# Patient Record
Sex: MALE | Race: WHITE | NOT HISPANIC OR LATINO
[De-identification: names, ages, dates, MRNs, and addresses within clinical notes are randomized per-mention and may not be internally consistent; named-entity substitution may affect disease eponyms.]

---

## 2023-06-21 ENCOUNTER — P2P PATIENT RECORD (OUTPATIENT)
Age: 68
End: 2023-06-21

## 2023-06-21 ENCOUNTER — TELEPHONE ENCOUNTER (OUTPATIENT)
Dept: URBAN - METROPOLITAN AREA CLINIC 63 | Facility: CLINIC | Age: 68
End: 2023-06-21

## 2023-07-06 ENCOUNTER — LAB OUTSIDE AN ENCOUNTER (OUTPATIENT)
Dept: URBAN - METROPOLITAN AREA CLINIC 63 | Facility: CLINIC | Age: 68
End: 2023-07-06

## 2023-07-06 ENCOUNTER — OFFICE VISIT (OUTPATIENT)
Dept: URBAN - METROPOLITAN AREA CLINIC 63 | Facility: CLINIC | Age: 68
End: 2023-07-06
Payer: COMMERCIAL

## 2023-07-06 ENCOUNTER — WEB ENCOUNTER (OUTPATIENT)
Dept: URBAN - METROPOLITAN AREA CLINIC 63 | Facility: CLINIC | Age: 68
End: 2023-07-06

## 2023-07-06 VITALS
TEMPERATURE: 98.4 F | SYSTOLIC BLOOD PRESSURE: 138 MMHG | HEART RATE: 114 BPM | BODY MASS INDEX: 23.07 KG/M2 | OXYGEN SATURATION: 98 % | DIASTOLIC BLOOD PRESSURE: 74 MMHG | WEIGHT: 147 LBS | HEIGHT: 67 IN

## 2023-07-06 DIAGNOSIS — Z12.11 COLON CANCER SCREENING: ICD-10-CM

## 2023-07-06 DIAGNOSIS — B18.2 CHRONIC HEPATITIS C WITHOUT HEPATIC COMA: ICD-10-CM

## 2023-07-06 PROBLEM — 128302006: Status: ACTIVE | Noted: 2023-07-06

## 2023-07-06 PROCEDURE — 99204 OFFICE O/P NEW MOD 45 MIN: CPT | Performed by: PHYSICIAN ASSISTANT

## 2023-07-06 RX ORDER — LISINOPRIL 10 MG/1
TAKE 1 TABLET BY MOUTH EVERY DAY TABLET ORAL
Qty: 90 EACH | Refills: 0 | Status: ACTIVE | COMMUNITY

## 2023-07-06 RX ORDER — POLYETHYLENE GLYCOL 3350, SODIUM CHLORIDE, SODIUM BICARBONATE, POTASSIUM CHLORIDE 420; 11.2; 5.72; 1.48 G/4L; G/4L; G/4L; G/4L
AS DIRECTED POWDER, FOR SOLUTION ORAL ONCE
Qty: 4000 | Refills: 0 | OUTPATIENT
Start: 2023-07-06 | End: 2023-07-07

## 2023-07-06 RX ORDER — ONDANSETRON HYDROCHLORIDE 4 MG/1
1 TABLET TABLET, FILM COATED ORAL
Qty: 2 | Refills: 0 | OUTPATIENT
Start: 2023-07-06

## 2023-07-06 RX ORDER — CLONAZEPAM 0.5 MG/1
TAKE 1 TABLET BY MOUTH THREE TIMES DAILY TABLET ORAL
Qty: 90 EACH | Refills: 0 | Status: ACTIVE | COMMUNITY

## 2023-07-06 NOTE — HPI-TODAY'S VISIT:
Dmitriy is a 67-year-old male who is referred for evaluation of chronic hepatitis C.  According to the referral notes, Dmitriy has been diagnosed with onset of hepatitis C 12/1/2007, genotype 3.  Other past medical history includes mild recurrent major depression, depressive disorder, benign essential hypertension, loss of appetite, bleeding from nose, anterior epistaxis, abnormal glucose level, stool DNA based colorectal cancer screening positive onset 4/13/2023, panic disorder.  Recent lab work performed 6/6/2023: CBC WBC 12.2, hemoglobin 13.2, hematocrit 39.2, platelets 356,  Patient had recent complaint of nose bleeds. Hasn't had a nose bleed in 3 weeks.  He has never had a colonoscopy.  Denies any diarrhea, constipation, weight loss, hematochezia, melena, acid reflux, heartburn, dysphagia. No cardiologist.  He does have history of smoking, denies COPD or asthma, admits he snores, no diagnosis of sleep apnea.

## 2023-07-13 ENCOUNTER — LAB OUTSIDE AN ENCOUNTER (OUTPATIENT)
Dept: URBAN - METROPOLITAN AREA CLINIC 63 | Facility: CLINIC | Age: 68
End: 2023-07-13

## 2023-08-03 ENCOUNTER — OUT OF OFFICE VISIT (OUTPATIENT)
Dept: URBAN - METROPOLITAN AREA SURGERY CENTER 4 | Facility: SURGERY CENTER | Age: 68
End: 2023-08-03
Payer: COMMERCIAL

## 2023-08-03 ENCOUNTER — CLAIMS CREATED FROM THE CLAIM WINDOW (OUTPATIENT)
Dept: URBAN - METROPOLITAN AREA CLINIC 4 | Facility: CLINIC | Age: 68
End: 2023-08-03
Payer: COMMERCIAL

## 2023-08-03 DIAGNOSIS — K57.30 DIVERTICULA OF COLON: ICD-10-CM

## 2023-08-03 DIAGNOSIS — Z12.11 COLON CANCER SCREENING: ICD-10-CM

## 2023-08-03 DIAGNOSIS — K63.5 BENIGN COLON POLYPS: ICD-10-CM

## 2023-08-03 DIAGNOSIS — D12.4 BENIGN NEOPLASM OF DESCENDING COLON: ICD-10-CM

## 2023-08-03 DIAGNOSIS — D12.3 BENIGN NEOPLASM OF TRANSVERSE COLON: ICD-10-CM

## 2023-08-03 DIAGNOSIS — K63.89 OTHER SPECIFIED DISEASES OF INTESTINE: ICD-10-CM

## 2023-08-03 DIAGNOSIS — K62.1 RECTAL POLYP: ICD-10-CM

## 2023-08-03 DIAGNOSIS — Z12.11 COLON CANCER SCREENING (HIGH RISK): ICD-10-CM

## 2023-08-03 DIAGNOSIS — A63.0 ANOGENITAL (VENEREAL) WARTS: ICD-10-CM

## 2023-08-03 DIAGNOSIS — D12.5 BENIGN NEOPLASM OF SIGMOID COLON: ICD-10-CM

## 2023-08-03 PROCEDURE — 00811 ANES LWR INTST NDSC NOS: CPT | Performed by: NURSE ANESTHETIST, CERTIFIED REGISTERED

## 2023-08-03 PROCEDURE — 45385 COLONOSCOPY W/LESION REMOVAL: CPT | Performed by: INTERNAL MEDICINE

## 2023-08-03 PROCEDURE — 45380 COLONOSCOPY AND BIOPSY: CPT | Performed by: INTERNAL MEDICINE

## 2023-08-03 PROCEDURE — 88341 IMHCHEM/IMCYTCHM EA ADD ANTB: CPT | Performed by: PATHOLOGY

## 2023-08-03 PROCEDURE — 88342 IMHCHEM/IMCYTCHM 1ST ANTB: CPT | Performed by: PATHOLOGY

## 2023-08-03 PROCEDURE — 88305 TISSUE EXAM BY PATHOLOGIST: CPT | Performed by: PATHOLOGY

## 2023-08-03 RX ORDER — CLONAZEPAM 0.5 MG/1
TAKE 1 TABLET BY MOUTH THREE TIMES DAILY TABLET ORAL
Qty: 90 EACH | Refills: 0 | Status: ACTIVE | COMMUNITY

## 2023-08-03 RX ORDER — LISINOPRIL 10 MG/1
TAKE 1 TABLET BY MOUTH EVERY DAY TABLET ORAL
Qty: 90 EACH | Refills: 0 | Status: ACTIVE | COMMUNITY

## 2023-08-03 RX ORDER — ONDANSETRON HYDROCHLORIDE 4 MG/1
1 TABLET TABLET, FILM COATED ORAL
Qty: 2 | Refills: 0 | Status: ACTIVE | COMMUNITY
Start: 2023-07-06

## 2023-08-21 LAB
A/G RATIO: 1.3
AFP, SERUM, TUMOR MARKER: 3.3
ALBUMIN: 4.4
ALKALINE PHOSPHATASE: 76
ALT (SGPT): 28
AST (SGOT): 37
BILIRUBIN, TOTAL: 0.4
BUN/CREATININE RATIO: 10
BUN: 10
CALCIUM: 9.4
CARBON DIOXIDE, TOTAL: 22
CHLORIDE: 97
CREATININE: 1.04
EGFR: 78
GLOBULIN, TOTAL: 3.4
GLUCOSE: 104
HBSAG SCREEN: NEGATIVE
HCV AB: REACTIVE
HCV LOG10: 5.12
HEMATOCRIT: 38.5
HEMOGLOBIN: 11.9
HEP B CORE AB, IGM: NEGATIVE
HEP B CORE AB, TOT: NEGATIVE
HEPATITIS C GENOTYPE: 3
HEPATITIS C QUANTITATION: (no result)
INTERPRETATION:: (no result)
Lab: (no result)
MCH: 24.4
MCHC: 30.9
MCV: 79
NRBC: (no result)
PLATELETS: 322
POTASSIUM: 4.6
PROTEIN, TOTAL: 7.8
QUANTIFERON CRITERIA: (no result)
QUANTIFERON INCUBATION: (no result)
QUANTIFERON MITOGEN VALUE: >10
QUANTIFERON NIL VALUE: 0.01
QUANTIFERON TB1 AG VALUE: 0.17
QUANTIFERON TB2 AG VALUE: 0.17
QUANTIFERON-TB GOLD PLUS: NEGATIVE
RBC: 4.87
RDW: 14
SODIUM: 136
TEST INFORMATION:: (no result)
WBC: 8.4

## 2023-08-24 ENCOUNTER — TELEPHONE ENCOUNTER (OUTPATIENT)
Dept: URBAN - METROPOLITAN AREA CLINIC 63 | Facility: CLINIC | Age: 68
End: 2023-08-24

## 2023-08-24 ENCOUNTER — OFFICE VISIT (OUTPATIENT)
Dept: URBAN - METROPOLITAN AREA CLINIC 63 | Facility: CLINIC | Age: 68
End: 2023-08-24
Payer: COMMERCIAL

## 2023-08-24 ENCOUNTER — LAB OUTSIDE AN ENCOUNTER (OUTPATIENT)
Dept: URBAN - METROPOLITAN AREA CLINIC 63 | Facility: CLINIC | Age: 68
End: 2023-08-24

## 2023-08-24 VITALS
OXYGEN SATURATION: 99 % | DIASTOLIC BLOOD PRESSURE: 88 MMHG | WEIGHT: 158 LBS | HEART RATE: 81 BPM | TEMPERATURE: 97.3 F | SYSTOLIC BLOOD PRESSURE: 146 MMHG | HEIGHT: 67 IN | BODY MASS INDEX: 24.8 KG/M2

## 2023-08-24 DIAGNOSIS — B18.2 CHRONIC HEPATITIS C WITHOUT HEPATIC COMA: ICD-10-CM

## 2023-08-24 DIAGNOSIS — A63.0 CONDYLOMA ACUMINATUM: ICD-10-CM

## 2023-08-24 DIAGNOSIS — K74.00 LIVER FIBROSIS: ICD-10-CM

## 2023-08-24 DIAGNOSIS — K57.90 DIVERTICULOSIS: ICD-10-CM

## 2023-08-24 DIAGNOSIS — Z86.010 PERSONAL HISTORY OF COLONIC POLYPS: ICD-10-CM

## 2023-08-24 PROBLEM — 62484002: Status: ACTIVE | Noted: 2023-08-24

## 2023-08-24 PROBLEM — 428283002: Status: ACTIVE | Noted: 2023-08-24

## 2023-08-24 PROBLEM — 240542006: Status: ACTIVE | Noted: 2023-08-24

## 2023-08-24 PROBLEM — 397881000: Status: ACTIVE | Noted: 2023-08-24

## 2023-08-24 PROCEDURE — 99214 OFFICE O/P EST MOD 30 MIN: CPT | Performed by: PHYSICIAN ASSISTANT

## 2023-08-24 RX ORDER — ONDANSETRON HYDROCHLORIDE 4 MG/1
1 TABLET TABLET, FILM COATED ORAL
Qty: 2 | Refills: 0 | COMMUNITY
Start: 2023-07-06

## 2023-08-24 RX ORDER — LISINOPRIL 10 MG/1
TAKE 1 TABLET BY MOUTH EVERY DAY TABLET ORAL
Qty: 90 EACH | Refills: 0 | COMMUNITY

## 2023-08-24 RX ORDER — VELPATASVIR AND SOFOSBUVIR 100; 400 MG/1; MG/1
1 TABLET TABLET, FILM COATED ORAL ONCE A DAY
Qty: 28 TABLET | Refills: 2 | OUTPATIENT
Start: 2023-08-24 | End: 2023-11-16

## 2023-08-24 RX ORDER — CLONAZEPAM 0.5 MG/1
TAKE 1 TABLET BY MOUTH THREE TIMES DAILY TABLET ORAL
Qty: 90 EACH | Refills: 0 | COMMUNITY

## 2023-08-24 NOTE — HPI-TODAY'S VISIT:
Dmitriy is here to follow up recent colonoscopy and chronic Hep C. Colonoscopy 8/3/2023, Dr. Mcdermott: Nonbleeding internal hemorrhoids Diverticulosis in the entire examined colon one 4 mm polyp in the distal rectum Two 5 to 8 mm polyps in the sigmoid colon One 6 mm polyp in the descending colon Three 6 to 7 mm polyps in the transverse colon Two 5 to 7 mm polyps in the ascending colon Pathology sigmoid polypectomy tubular adenomas, hyperplastic polyps.  Transverse polypectomy tubular adenomas benign mucosal polyps.  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  FibroScan 7/24/2023 S0, F4. Ultrasound abdomen 7/20/2023 Morphologic changes of cirrhosis without suspicious hepatic mass. Benign-appearing cyst noted measuring up to 0.9 cm. Recommend additional surveillance with either CT or MRI every 6 to 12 months to increase the sensitivity of detecting small hepatocellular carcinoma. Cholelithiasis without cholecystitis. Lab work performed 8/15/2023 AFP 3.3 Hepatitis B core antibody, IgM negative QuantiFERON TB Gold plus negative Glucose 104 Creatinine 1.04 Sodium 136 Potassium 4.6 Albumin 4.4 Bilirubin 0.4 Alkaline phosphatase 76 AST 37 ALT 28 WBC 8.4 Hemoglobin 11.9 Hematocrit 38.5 MCV 79 MCH 24.4 MCHC 30.9 Platelets 322 Hep B core antibody total negative Hep B surface antigen negative Hep C genotype 3 Hep C antibody reactive Hepatitis C quantitation 133,000 HCV log 10 5.1-4   LOV 7/6/23: Dmitriy is a 67-year-old male who is referred for evaluation of chronic hepatitis C.  According to the referral notes, Dmitriy has been diagnosed with onset of hepatitis C 12/1/2007, genotype 3.  Other past medical history includes mild recurrent major depression, depressive disorder, benign essential hypertension, loss of appetite, bleeding from nose, anterior epistaxis, abnormal glucose level, stool DNA based colorectal cancer screening positive onset 4/13/2023, panic disorder.  Recent lab work performed 6/6/2023: CBC WBC 12.2, hemoglobin 13.2, hematocrit 39.2, platelets 356,  Patient had recent complaint of nose bleeds. Hasn't had a nose bleed in 3 weeks.  He has never had a colonoscopy.  Denies any diarrhea, constipation, weight loss, hematochezia, melena, acid reflux, heartburn, dysphagia. No cardiologist.  He does have history of smoking, denies COPD or asthma, admits he snores, no diagnosis of sleep apnea.

## 2023-09-25 LAB
A/G RATIO: 1.3
ALBUMIN: 4.4
ALKALINE PHOSPHATASE: 68
ALT (SGPT): 12
AST (SGOT): 19
BILIRUBIN, TOTAL: 0.4
BUN/CREATININE RATIO: 12
BUN: 13
CALCIUM: 9.5
CARBON DIOXIDE, TOTAL: 23
CHLORIDE: 96
CREATININE: 1.12
EGFR: 71
GLOBULIN, TOTAL: 3.5
GLUCOSE: 109
HCV LOG10: 1.3
HEMATOCRIT: 40.5
HEMOGLOBIN: 12.8
HEPATITIS C QUANTITATION: 20
Lab: (no result)
MCH: 23.7
MCHC: 31.6
MCV: 75
NRBC: (no result)
PLATELETS: 302
POTASSIUM: 4.8
PROTEIN, TOTAL: 7.9
RBC: 5.4
RDW: 15.2
SODIUM: 133
TEST INFORMATION:: (no result)
WBC: 7.9

## 2023-10-02 ENCOUNTER — OFFICE VISIT (OUTPATIENT)
Dept: URBAN - METROPOLITAN AREA CLINIC 63 | Facility: CLINIC | Age: 68
End: 2023-10-02
Payer: COMMERCIAL

## 2023-10-02 VITALS
TEMPERATURE: 97.2 F | BODY MASS INDEX: 24.96 KG/M2 | OXYGEN SATURATION: 97 % | HEART RATE: 77 BPM | DIASTOLIC BLOOD PRESSURE: 100 MMHG | SYSTOLIC BLOOD PRESSURE: 150 MMHG | HEIGHT: 67 IN | WEIGHT: 159 LBS

## 2023-10-02 DIAGNOSIS — A63.0 CONDYLOMA ACUMINATA: ICD-10-CM

## 2023-10-02 DIAGNOSIS — Z86.010 PERSONAL HISTORY OF COLONIC POLYPS: ICD-10-CM

## 2023-10-02 DIAGNOSIS — K74.69 OTHER CIRRHOSIS OF LIVER: ICD-10-CM

## 2023-10-02 DIAGNOSIS — B18.2 CHRONIC HEPATITIS C WITHOUT HEPATIC COMA: ICD-10-CM

## 2023-10-02 PROBLEM — 19943007: Status: ACTIVE | Noted: 2023-10-02

## 2023-10-02 PROCEDURE — 99214 OFFICE O/P EST MOD 30 MIN: CPT | Performed by: PHYSICIAN ASSISTANT

## 2023-10-02 RX ORDER — VELPATASVIR AND SOFOSBUVIR 100; 400 MG/1; MG/1
1 TABLET TABLET, FILM COATED ORAL ONCE A DAY
Qty: 28 TABLET | Refills: 2 | Status: ACTIVE | COMMUNITY
Start: 2023-08-24 | End: 2023-11-16

## 2023-10-02 RX ORDER — CLONAZEPAM 0.5 MG/1
TAKE 1 TABLET BY MOUTH THREE TIMES DAILY TABLET ORAL
Qty: 90 EACH | Refills: 0 | Status: ACTIVE | COMMUNITY

## 2023-10-02 RX ORDER — LISINOPRIL 10 MG/1
TAKE 1 TABLET BY MOUTH EVERY DAY TABLET ORAL
Qty: 90 EACH | Refills: 0 | Status: ACTIVE | COMMUNITY

## 2023-10-02 NOTE — HPI-TODAY'S VISIT:
67-year-old male with cirrhosis secondary to chronic hepatitis C presents for follow-up.  He was initially referred to the office in July 2023 for treatment of chronic hepatitis C.  Pretreatment labs were done August 15, 2023 which demonstrated a hemoglobin 11.9, platelets 322, normal LFTs, HCV ,000 IU/mL, genotype 3.  AFP was normal at 3.3. Abdominal ultrasound was done July 20, 2023 and demonstrated a cirrhotic appearing liver without suspicious hepatic mass.  There was a benign-appearing cyst measuring up to 0.9 cm.  Gallstones were noted without cholecystitis.  Elastography July 24, 2023 showed no steatosis (S0) with advanced fibrosis or cirrhosis (F4).  He was prescribed a 12-week course of Epclusa on August 24, 2023. States he began his course of Epclusa on 9/7/23. He is tolerating the medication well without any bothersome side effects. He has no GI complaints today. Denies any pyrosis, dysphagia, odynophagia, nausea, vomiting, abdominal pain, constipation, diarrhea, rectal bleeding.   He follows up today doing well. He had repeat labs done on September 21, 2023.  His CBC demonstrated a hemoglobin 12.8, platelets 302.  His liver profile was unremarkable.  Albumin 4.4, sodium 133, creatinine 1.12.  HCV RNA was 20 IU/mL.  MRI of the liver was ordered at his last office visit to complete in February 2024 for HCC surveillance.  He is scheduled for EGD to screen for esophageal varices on November 8, 2023.  Baseline colonoscopy was done August 3, 2023 which demonstrated a total of 9 polyps which were removed from various locations.  Pathology showing tubular adenoma, hyperplastic polyps, and benign mucosal polyps.  There was one condyloma in the rectum.  Repeat colonoscopy was recommended in 1 year.

## 2023-10-16 ENCOUNTER — TELEPHONE ENCOUNTER (OUTPATIENT)
Dept: URBAN - METROPOLITAN AREA CLINIC 64 | Facility: CLINIC | Age: 68
End: 2023-10-16

## 2023-11-02 ENCOUNTER — LAB OUTSIDE AN ENCOUNTER (OUTPATIENT)
Dept: URBAN - METROPOLITAN AREA CLINIC 63 | Facility: CLINIC | Age: 68
End: 2023-11-02

## 2023-11-03 LAB
A/G RATIO: 1.6
ALBUMIN: 4.5
ALKALINE PHOSPHATASE: 65
ALT (SGPT): 12
AST (SGOT): 17
BILIRUBIN, TOTAL: 0.3
BUN/CREATININE RATIO: 12
BUN: 13
CALCIUM: 9.3
CARBON DIOXIDE, TOTAL: 25
CHLORIDE: 92
CREATININE: 1.06
EGFR: 75
GLOBULIN, TOTAL: 2.9
GLUCOSE: 114
HEMATOCRIT: 41
HEMOGLOBIN: 13.3
MCH: 23.6
MCHC: 32.4
MCV: 73
NRBC: (no result)
PLATELETS: 306
POTASSIUM: 4.9
PROTEIN, TOTAL: 7.4
RBC: 5.64
RDW: 18.3
SODIUM: 129
WBC: 9.4

## 2023-11-07 ENCOUNTER — LAB OUTSIDE AN ENCOUNTER (OUTPATIENT)
Dept: URBAN - METROPOLITAN AREA CLINIC 63 | Facility: CLINIC | Age: 68
End: 2023-11-07

## 2023-11-08 ENCOUNTER — OFFICE VISIT (OUTPATIENT)
Dept: URBAN - METROPOLITAN AREA MEDICAL CENTER 14 | Facility: MEDICAL CENTER | Age: 68
End: 2023-11-08

## 2023-11-08 RX ORDER — CLONAZEPAM 0.5 MG/1
TAKE 1 TABLET BY MOUTH THREE TIMES DAILY TABLET ORAL
Qty: 90 EACH | Refills: 0 | Status: ACTIVE | COMMUNITY

## 2023-11-08 RX ORDER — LISINOPRIL 10 MG/1
TAKE 1 TABLET BY MOUTH EVERY DAY TABLET ORAL
Qty: 90 EACH | Refills: 0 | Status: ACTIVE | COMMUNITY

## 2023-11-08 RX ORDER — VELPATASVIR AND SOFOSBUVIR 100; 400 MG/1; MG/1
1 TABLET TABLET, FILM COATED ORAL ONCE A DAY
Qty: 28 TABLET | Refills: 2 | Status: ACTIVE | COMMUNITY
Start: 2023-08-24 | End: 2023-11-16

## 2023-11-09 ENCOUNTER — OFFICE VISIT (OUTPATIENT)
Dept: URBAN - METROPOLITAN AREA CLINIC 63 | Facility: CLINIC | Age: 68
End: 2023-11-09

## 2023-11-09 RX ORDER — VELPATASVIR AND SOFOSBUVIR 100; 400 MG/1; MG/1
1 TABLET TABLET, FILM COATED ORAL ONCE A DAY
Qty: 28 TABLET | Refills: 2 | Status: ACTIVE | COMMUNITY
Start: 2023-08-24 | End: 2023-11-16

## 2023-11-09 RX ORDER — LISINOPRIL 10 MG/1
TAKE 1 TABLET BY MOUTH EVERY DAY TABLET ORAL
Qty: 90 EACH | Refills: 0 | Status: ACTIVE | COMMUNITY

## 2023-11-09 RX ORDER — CLONAZEPAM 0.5 MG/1
TAKE 1 TABLET BY MOUTH THREE TIMES DAILY TABLET ORAL
Qty: 90 EACH | Refills: 0 | Status: ACTIVE | COMMUNITY

## 2023-11-09 NOTE — HPI-TODAY'S VISIT:
68-year-old male with cirrhosis secondary to chronic hepatitis C presents for follow-up.  He was initially referred in July 2023 for treatment of chronic hepatitis C.  Pretreatment labs were done on August 15, 2023 which demonstrated a hemoglobin 11.9, platelets 322, normal LFTs, HCVRNA 133,000 IU/mL, genotype 3.  AFP was normal at 3.3.  Abdominal ultrasound July 20, 2023 showed a cirrhotic appearing liver without suspicious hepatic mass.  There was a benign appearing cyst measuring up to 0.9 cm.  Gallstones were noted without evidence of cholecystitis.  Elastography July 24, 2023 showed no steatosis (S0) with advanced fibrosis or cirrhosis (F4). He began a 12-week course of close on September 7, 2023.  He was last seen in the office on October 2, 2023 doing well.  He was tolerating the medication well without any bothersome side effects.  He had no GI complaints. Labs done September 21, 2023 demonstrated an HCVRNA 20 IU/mL.  CBC demonstrated a hemoglobin 12.8, platelets 302.  Liver profile was unremarkable.  Sodium 133, creatinine 1.12. He was scheduled for EGD to screen for varices on November 8, 2023 but canceled. Repeat labs were done on November 2, 2023.  His CBC demonstrated a hemoglobin 13.3, platelet count 306.  CMP was unremarkable with the exception of low sodium at 129.  HCVRNA was ordered but the result has not been received. He follows up today Baseline colonoscopy was done August 3, 2023 which demonstrated a total of 9 polyps which were removed from various locations of the colon.  Pathology showed tubular adenoma, hyperplastic polyps, and benign mucosal polyps.  There was one condyloma in the rectum.  Repeat colonoscopy was recommended in 1 year.

## 2023-11-20 ENCOUNTER — TELEPHONE ENCOUNTER (OUTPATIENT)
Dept: URBAN - METROPOLITAN AREA CLINIC 63 | Facility: CLINIC | Age: 68
End: 2023-11-20

## 2023-11-20 RX ORDER — CLONAZEPAM 0.5 MG/1
TAKE 1 TABLET BY MOUTH THREE TIMES DAILY TABLET ORAL
OUTPATIENT

## 2023-11-22 ENCOUNTER — OFFICE VISIT (OUTPATIENT)
Dept: URBAN - METROPOLITAN AREA CLINIC 63 | Facility: CLINIC | Age: 68
End: 2023-11-22

## 2023-12-04 ENCOUNTER — TELEPHONE ENCOUNTER (OUTPATIENT)
Dept: URBAN - METROPOLITAN AREA CLINIC 64 | Facility: CLINIC | Age: 68
End: 2023-12-04

## 2024-01-04 ENCOUNTER — OFFICE VISIT (OUTPATIENT)
Dept: URBAN - METROPOLITAN AREA CLINIC 63 | Facility: CLINIC | Age: 69
End: 2024-01-04

## 2024-01-25 ENCOUNTER — OFFICE VISIT (OUTPATIENT)
Dept: URBAN - METROPOLITAN AREA CLINIC 63 | Facility: CLINIC | Age: 69
End: 2024-01-25
Payer: COMMERCIAL

## 2024-01-25 VITALS
BODY MASS INDEX: 26.06 KG/M2 | SYSTOLIC BLOOD PRESSURE: 152 MMHG | WEIGHT: 166 LBS | OXYGEN SATURATION: 96 % | TEMPERATURE: 97.1 F | HEIGHT: 67 IN | DIASTOLIC BLOOD PRESSURE: 88 MMHG | HEART RATE: 70 BPM

## 2024-01-25 DIAGNOSIS — A63.0 CONDYLOMA ACUMINATA: ICD-10-CM

## 2024-01-25 DIAGNOSIS — K74.69 OTHER CIRRHOSIS OF LIVER: ICD-10-CM

## 2024-01-25 DIAGNOSIS — Z86.010 PERSONAL HISTORY OF COLONIC POLYPS: ICD-10-CM

## 2024-01-25 DIAGNOSIS — B18.2 CHRONIC HEPATITIS C WITHOUT HEPATIC COMA: ICD-10-CM

## 2024-01-25 PROCEDURE — 99214 OFFICE O/P EST MOD 30 MIN: CPT | Performed by: PHYSICIAN ASSISTANT

## 2024-01-25 RX ORDER — LISINOPRIL 20 MG/1
TAKE 1 TABLET BY MOUTH EVERY DAY TABLET ORAL ONCE A DAY
Refills: 0 | Status: ACTIVE | COMMUNITY

## 2024-01-25 RX ORDER — BISOPROLOL FUMARATE 5 MG/1
1 TABLET TABLET, FILM COATED ORAL ONCE A DAY
Status: ACTIVE | COMMUNITY

## 2024-01-25 NOTE — HPI-TODAY'S VISIT:
68-year-old male with cirrhosis secondary to chronic hepatitis C presents for follow-up.  He was initially referred in July 2023 for treatment of chronic hepatitis C.  Pretreatment labs were done on August 15, 2023 which demonstrated a hemoglobin 11.9, platelets 322, normal LFTs, HCVRNA 133,000 IU/mL, genotype 3.  AFP was normal at 3.3.  Abdominal ultrasound July 20, 2023 showed a cirrhotic appearing liver without suspicious hepatic mass.  There was a benign appearing cyst measuring up to 0.9 cm.  Gallstones were noted without evidence of cholecystitis.  Elastography July 24, 2023 showed no steatosis (S0) with advanced fibrosis or cirrhosis (F4). He began a 12-week course of close on September 7, 2023.  He was last seen in the office on October 2, 2023 doing well.  He was tolerating the medication well without any bothersome side effects.  He had no GI complaints. Labs done September 21, 2023 demonstrated an HCVRNA 20 IU/mL.  CBC demonstrated a hemoglobin 12.8, platelets 302.  Liver profile was unremarkable.  Sodium 133, creatinine 1.12. He was scheduled for EGD to screen for varices on November 8, 2023 but canceled. Repeat labs were done on November 2, 2023.  His CBC demonstrated a hemoglobin 13.3, platelet count 306.  CMP was unremarkable with the exception of low sodium at 129.  HCVRNA was ordered but the result has not been received. He follows up today doing well. He has no GI complaints. He completed his course of Epclusa in late Nov/early Dec 2023.  Baseline colonoscopy was done August 3, 2023 which demonstrated a total of 9 polyps which were removed from various locations of the colon.  Pathology showed tubular adenoma, hyperplastic polyps, and benign mucosal polyps.  There was one condyloma in the rectum.  Repeat colonoscopy was recommended in 1 year.

## 2024-02-22 ENCOUNTER — LAB (OUTPATIENT)
Dept: URBAN - METROPOLITAN AREA CLINIC 63 | Facility: CLINIC | Age: 69
End: 2024-02-22

## 2024-02-24 LAB
A/G RATIO: 1.5
AFP, SERUM, TUMOR MARKER: 2.8
ALBUMIN: 4.8
ALKALINE PHOSPHATASE: 72
ALT (SGPT): 18
AST (SGOT): 23
BILIRUBIN, TOTAL: 0.9
BUN/CREATININE RATIO: 14
BUN: 17
CALCIUM: 9.4
CARBON DIOXIDE, TOTAL: 23
CHLORIDE: 95
CREATININE: 1.21
EGFR: 64
GLOBULIN, TOTAL: 3.2
GLUCOSE: 110
HCV LOG10: (no result)
HEMATOCRIT: 45.3
HEMOGLOBIN: 15
HEPATITIS C QUANTITATION: (no result)
INR: 1
MCH: 27.3
MCHC: 33.1
MCV: 83
NRBC: (no result)
PLATELETS: 254
POTASSIUM: 4.6
PROTEIN, TOTAL: 8
PROTHROMBIN TIME: 11
RBC: 5.49
RDW: 15
SODIUM: 133
TEST INFORMATION:: (no result)
WBC: 9.5

## 2024-03-04 ENCOUNTER — LAB (OUTPATIENT)
Dept: URBAN - METROPOLITAN AREA CLINIC 63 | Facility: CLINIC | Age: 69
End: 2024-03-04

## 2024-03-07 ENCOUNTER — LAB (OUTPATIENT)
Dept: URBAN - METROPOLITAN AREA CLINIC 63 | Facility: CLINIC | Age: 69
End: 2024-03-07

## 2024-03-07 ENCOUNTER — OV EP (OUTPATIENT)
Dept: URBAN - METROPOLITAN AREA CLINIC 63 | Facility: CLINIC | Age: 69
End: 2024-03-07
Payer: MEDICARE

## 2024-03-07 VITALS
HEART RATE: 75 BPM | SYSTOLIC BLOOD PRESSURE: 140 MMHG | OXYGEN SATURATION: 98 % | DIASTOLIC BLOOD PRESSURE: 70 MMHG | WEIGHT: 155 LBS | HEIGHT: 67 IN | TEMPERATURE: 97.5 F | BODY MASS INDEX: 24.33 KG/M2

## 2024-03-07 DIAGNOSIS — R59.1 LYMPHADENOPATHY: ICD-10-CM

## 2024-03-07 DIAGNOSIS — J94.8 PLEURAL MASS: ICD-10-CM

## 2024-03-07 DIAGNOSIS — B18.2 CHRONIC HEPATITIS C WITHOUT HEPATIC COMA: ICD-10-CM

## 2024-03-07 DIAGNOSIS — A63.0 CONDYLOMA ACUMINATA: ICD-10-CM

## 2024-03-07 DIAGNOSIS — Z86.010 PERSONAL HISTORY OF COLONIC POLYPS: ICD-10-CM

## 2024-03-07 DIAGNOSIS — K86.2 PANCREATIC CYST: ICD-10-CM

## 2024-03-07 DIAGNOSIS — K74.69 OTHER CIRRHOSIS OF LIVER: ICD-10-CM

## 2024-03-07 PROCEDURE — 99214 OFFICE O/P EST MOD 30 MIN: CPT | Performed by: PHYSICIAN ASSISTANT

## 2024-03-07 RX ORDER — LISINOPRIL 20 MG/1
TAKE 1 TABLET BY MOUTH EVERY DAY TABLET ORAL ONCE A DAY
Refills: 0 | Status: ACTIVE | COMMUNITY

## 2024-03-07 RX ORDER — BISOPROLOL FUMARATE 5 MG/1
1 TABLET TABLET, FILM COATED ORAL ONCE A DAY
Status: ACTIVE | COMMUNITY

## 2024-03-07 RX ORDER — CLONAZEPAM 0.5 MG/1
TABLET ORAL
Qty: 60 TABLET | Status: ACTIVE | COMMUNITY

## 2024-03-07 NOTE — HPI-TODAY'S VISIT:
68-year-old male with history of cirrhosis secondary to hepatitis C presents for follow-up. He was referred to the office in July 2023 for treatment of chronic hepatitis C.  His pretreatment workup included abdominal ultrasound which showed a cirrhotic appearing liver without suspicious hepatic mass.  Elastography done in July 2023 showed no steatosis (S0) with advanced fibrosis or cirrhosis (F4). He was treated with a 12-week course of Epclusa. Labs done in September 2023 demonstrated HCVRNA 20 IU/mL.  His liver profile was unremarkable. He was scheduled for EGD to screen for varices in November 2023 but he canceled his procedure and refused further recommendations to reschedule this. He follows up today after labs which were done on February 22, 2024.  His CBC was unremarkable with hemoglobin 15, platelet count 254.  CMP demonstrated a normal liver profile with AST 23, ALT 18, alkaline phosphatase 72, total bilirubin 0.9, albumin 4.8, sodium 133, creatinine 1.21.  INR 1.0.  HCVRNA undetectable. MRI of the abdomen with and without contrast was done 2/29/2024 for HCC surveillance.  His MRI demonstrated a mildly cirrhotic appearing liver without evidence of HCC or portal hypertension.  Numerous bile duct heart hamartomas.  Cholelithiasis.  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  Follow-up MRI recommended in 6 months.  There was abnormal soft tissue in the virgen hepatis parallel to the portal vein contiguous with lymphadenopathy in the portacaval region.  Abnormal band of soft tissue in the left posterior lateral lung base, probably in the extrapleural space or perhaps some pleural-based.  Scar tissue versus neoplastic mass is indeterminate.  There is also probably lymphadenopathy in the right pericardiac region. Following his MRI, CT chest, abdomen and pelvis was ordered but not yet completed. He follows up today doing well without any GI complaints.  Baseline colonoscopy was done August 3, 2023 which demonstrated a total of 9 polyps which were removed from various portions of the colon.  Pathology showed tubular adenoma, hyperplastic polyps and benign mucosal polyps.  There was 1 condyloma in the rectum.  Repeat colonoscopy was recommended in 1 year. He had repeat labs done in November 2023 including HCVRNA but the result was not received.

## 2024-08-08 ENCOUNTER — TELEPHONE ENCOUNTER (OUTPATIENT)
Dept: URBAN - METROPOLITAN AREA CLINIC 63 | Facility: CLINIC | Age: 69
End: 2024-08-08

## 2024-09-02 ENCOUNTER — LAB OUTSIDE AN ENCOUNTER (OUTPATIENT)
Dept: URBAN - METROPOLITAN AREA CLINIC 63 | Facility: CLINIC | Age: 69
End: 2024-09-02

## 2024-09-07 ENCOUNTER — LAB OUTSIDE AN ENCOUNTER (OUTPATIENT)
Dept: URBAN - METROPOLITAN AREA CLINIC 63 | Facility: CLINIC | Age: 69
End: 2024-09-07

## 2024-10-22 ENCOUNTER — TELEPHONE ENCOUNTER (OUTPATIENT)
Dept: URBAN - METROPOLITAN AREA CLINIC 9 | Facility: CLINIC | Age: 69
End: 2024-10-22

## 2025-01-29 ENCOUNTER — OFFICE VISIT (OUTPATIENT)
Dept: URBAN - METROPOLITAN AREA CLINIC 9 | Facility: CLINIC | Age: 70
End: 2025-01-29